# Patient Record
Sex: FEMALE | Race: WHITE | Employment: UNEMPLOYED | URBAN - METROPOLITAN AREA
[De-identification: names, ages, dates, MRNs, and addresses within clinical notes are randomized per-mention and may not be internally consistent; named-entity substitution may affect disease eponyms.]

---

## 2018-01-18 ENCOUNTER — OFFICE VISIT (OUTPATIENT)
Dept: INTERNAL MEDICINE CLINIC | Facility: CLINIC | Age: 53
End: 2018-01-18

## 2018-01-18 VITALS
WEIGHT: 185 LBS | BODY MASS INDEX: 31.58 KG/M2 | HEIGHT: 64 IN | SYSTOLIC BLOOD PRESSURE: 128 MMHG | OXYGEN SATURATION: 94 % | HEART RATE: 88 BPM | DIASTOLIC BLOOD PRESSURE: 80 MMHG | TEMPERATURE: 98 F

## 2018-01-18 DIAGNOSIS — R06.2 WHEEZING: ICD-10-CM

## 2018-01-18 DIAGNOSIS — J18.9 WALKING PNEUMONIA: Primary | ICD-10-CM

## 2018-01-18 DIAGNOSIS — R05.9 COUGH: ICD-10-CM

## 2018-01-18 PROCEDURE — 99212 OFFICE O/P EST SF 10 MIN: CPT | Performed by: INTERNAL MEDICINE

## 2018-01-18 PROCEDURE — 99214 OFFICE O/P EST MOD 30 MIN: CPT | Performed by: INTERNAL MEDICINE

## 2018-01-18 RX ORDER — PREDNISONE 1 MG/1
5 TABLET ORAL 2 TIMES DAILY
Qty: 6 TABLET | Refills: 0 | Status: SHIPPED | OUTPATIENT
Start: 2018-01-18 | End: 2018-01-21

## 2018-01-18 RX ORDER — LEVOFLOXACIN 500 MG/1
500 TABLET, FILM COATED ORAL DAILY
Qty: 10 TABLET | Refills: 0 | Status: SHIPPED | OUTPATIENT
Start: 2018-01-18 | End: 2018-01-28

## 2018-01-18 RX ORDER — BENZONATATE 100 MG/1
100 CAPSULE ORAL 3 TIMES DAILY PRN
Qty: 20 CAPSULE | Refills: 0 | Status: SHIPPED | OUTPATIENT
Start: 2018-01-18 | End: 2018-01-25

## 2018-01-18 NOTE — PATIENT INSTRUCTIONS
ASSESSMENT/PLAN:   Walking pneumonia  (primary encounter diagnosis) will treat with antibiotic take as prescribed let us know if not better  Wheezing breathing treatment in office also was sent home with albuterol inhaler and steroids  Cough cough medicine

## 2018-01-18 NOTE — PROGRESS NOTES
HPI:    Patient ID: Nicolasa Galvan is a 47year old female. HPI  Patient comes in today with complaint of cough shortness of breath wheezing is been going on for over 7-8 days. Fatigue body ache patient says she did get a flu shot.   She smokes sm HENT:   Head: Normocephalic and atraumatic. Eyes: Conjunctivae are normal. Pupils are equal, round, and reactive to light. No scleral icterus. Neck: Normal range of motion. Neck supple. Cardiovascular: Normal rate and regular rhythm.     Pulmonary/C

## 2018-01-19 ENCOUNTER — TELEPHONE (OUTPATIENT)
Dept: INTERNAL MEDICINE CLINIC | Facility: CLINIC | Age: 53
End: 2018-01-19

## 2018-01-19 RX ORDER — ALBUTEROL SULFATE 90 UG/1
2 AEROSOL, METERED RESPIRATORY (INHALATION) EVERY 4 HOURS PRN
Qty: 1 INHALER | Refills: 4 | Status: SHIPPED | OUTPATIENT
Start: 2018-01-19 | End: 2018-04-26

## 2018-01-19 NOTE — TELEPHONE ENCOUNTER
Akil Patel from pharmacy states, Proair and or Regular Ventolin are covered. States the one that was sent was powder form.

## 2018-01-19 NOTE — TELEPHONE ENCOUNTER
Patient's insurance will cover Ventolin . [facility] Ok to substitue?    Patient seen yesterday for pneumonia

## 2018-01-19 NOTE — TELEPHONE ENCOUNTER
Patients mom called, the rx for the inhaler is very expensive. Is there a generic?  If not she will still keep the rx for the inhaler

## 2018-01-19 NOTE — TELEPHONE ENCOUNTER
Spoke to Nemesio Alarcon from pharmacy. Informed may switch over to pro air. Verbalized understanding with no further questions at this time.

## 2018-02-26 ENCOUNTER — OFFICE VISIT (OUTPATIENT)
Dept: INTERNAL MEDICINE CLINIC | Facility: CLINIC | Age: 53
End: 2018-02-26

## 2018-02-26 VITALS
SYSTOLIC BLOOD PRESSURE: 135 MMHG | HEIGHT: 64 IN | DIASTOLIC BLOOD PRESSURE: 85 MMHG | BODY MASS INDEX: 31.58 KG/M2 | HEART RATE: 94 BPM | WEIGHT: 185 LBS | TEMPERATURE: 98 F

## 2018-02-26 DIAGNOSIS — Z12.11 SCREENING FOR COLON CANCER: ICD-10-CM

## 2018-02-26 DIAGNOSIS — M25.561 ACUTE PAIN OF BOTH KNEES: ICD-10-CM

## 2018-02-26 DIAGNOSIS — Z12.39 SCREENING FOR BREAST CANCER: ICD-10-CM

## 2018-02-26 DIAGNOSIS — M25.562 ACUTE PAIN OF BOTH KNEES: ICD-10-CM

## 2018-02-26 DIAGNOSIS — Z01.411 ENCOUNTER FOR GYNECOLOGICAL EXAMINATION WITH ABNORMAL FINDING: ICD-10-CM

## 2018-02-26 DIAGNOSIS — Z00.00 ROUTINE MEDICAL EXAM: Primary | ICD-10-CM

## 2018-02-26 DIAGNOSIS — M25.569 KNEE PAIN, UNSPECIFIED CHRONICITY, UNSPECIFIED LATERALITY: ICD-10-CM

## 2018-02-26 PROBLEM — M15.9 OSTEOARTHRITIS OF MULTIPLE JOINTS: Status: ACTIVE | Noted: 2018-02-26

## 2018-02-26 PROCEDURE — 99212 OFFICE O/P EST SF 10 MIN: CPT | Performed by: INTERNAL MEDICINE

## 2018-02-26 PROCEDURE — 99214 OFFICE O/P EST MOD 30 MIN: CPT | Performed by: INTERNAL MEDICINE

## 2018-02-26 RX ORDER — NAPROXEN 500 MG/1
500 TABLET ORAL 2 TIMES DAILY WITH MEALS
Qty: 40 TABLET | Refills: 0 | Status: SHIPPED | OUTPATIENT
Start: 2018-02-26

## 2018-02-26 NOTE — PROGRESS NOTES
HPI:    Patient ID: Ernie Banegas is a 47year old female. HPI she came in today with pain in her  joints . She she states that  This is  ongoing pain for many years now but she never had insurance and she did not follow-up.   Her worst pain is in problems, confusion, hallucinations and sleep disturbance. The patient is not nervous/anxious. Current Outpatient Prescriptions:  naproxen 500 MG Oral Tab Take 1 tablet (500 mg total) by mouth 2 (two) times daily with meals.  Disp: 40 tablet Rfl: are normal. Mucous membranes are not cyanotic. No oropharyngeal exudate, posterior oropharyngeal edema or posterior oropharyngeal erythema. Eyes: Conjunctivae and EOM are normal. Pupils are equal, round, and reactive to light.  Right eye exhibits no disch shoulder exercise  instruction given , will consider referal for rheumathology after blood work    Screening for colon cancer referal to gi  Smoker- advised to quit smoking -discussed about options , she will quit on her own          Orders Placed This Enc

## 2018-02-26 NOTE — PATIENT INSTRUCTIONS
Routine medical exam  (primary encounter diagnosis) blood work today    Encounter for gynecological examination with abnormal finding-referral to OB    Screening for breast cancer referral for mammogram    Polyarthralgia -etiology ?  Will order blood work f

## 2018-04-26 NOTE — PROGRESS NOTES
[place] Mon    8/25/1965       Patient presents with:  Gyn Problem: menopausal symptoms  Patient is a new patient for me she was initially scheduled for a annual exam.  She is here today however for a 2 year history of severe mood swings-she thi time patient will also have her annual exam done at that time. She understands she will receive no further refills until she has a current GYN exam and mammogram on file. She also has frequent leakage from her bladder.   She states that it is not associ

## 2018-05-02 ENCOUNTER — TELEPHONE (OUTPATIENT)
Dept: OBGYN CLINIC | Facility: CLINIC | Age: 53
End: 2018-05-02

## 2018-05-02 RX ORDER — VENLAFAXINE HYDROCHLORIDE 75 MG/1
75 CAPSULE, EXTENDED RELEASE ORAL DAILY
Qty: 30 CAPSULE | Refills: 2 | Status: SHIPPED | OUTPATIENT
Start: 2018-05-02

## 2018-05-02 NOTE — TELEPHONE ENCOUNTER
Reviewed with [facility] in office. Pt is to take one tablet daily and return for annual in 3 months. #30 with 2 refills sent to pharmacy.

## 2018-05-02 NOTE — TELEPHONE ENCOUNTER
Pharmacy calling and wanting to know if [facility] wants only 6 capsules with one refill for Venlafaxine. Sent to [facility].

## 2023-01-25 ENCOUNTER — PATIENT OUTREACH (OUTPATIENT)
Dept: CASE MANAGEMENT | Age: 58
End: 2023-01-25

## 2023-01-25 NOTE — PROCEDURES
The office order for PCP request is Approved and completed on January 25, 2023.     Thanks,  [facility] [employer]